# Patient Record
Sex: FEMALE | Race: WHITE | NOT HISPANIC OR LATINO | Employment: OTHER | URBAN - METROPOLITAN AREA
[De-identification: names, ages, dates, MRNs, and addresses within clinical notes are randomized per-mention and may not be internally consistent; named-entity substitution may affect disease eponyms.]

---

## 2023-10-24 DIAGNOSIS — J01.10 ACUTE FRONTAL SINUSITIS, RECURRENCE NOT SPECIFIED: Primary | ICD-10-CM

## 2023-10-24 RX ORDER — DOXYCYCLINE 100 MG/1
100 CAPSULE ORAL 2 TIMES DAILY
Qty: 20 CAPSULE | Refills: 0 | Status: SHIPPED | OUTPATIENT
Start: 2023-10-24 | End: 2023-11-03

## 2024-02-06 DIAGNOSIS — F41.9 ANXIETY: Primary | ICD-10-CM

## 2024-02-06 RX ORDER — SERTRALINE HYDROCHLORIDE 50 MG/1
50 TABLET, FILM COATED ORAL DAILY
Qty: 90 TABLET | Refills: 3 | Status: SHIPPED | OUTPATIENT
Start: 2024-02-06 | End: 2025-01-31

## 2024-02-06 NOTE — PROGRESS NOTES
Subjective   Patient ID: Marilyn Acosta is a 74 y.o. female who presents for No chief complaint on file..  HPI    There is no problem list on file for this patient.      Social Connections: Not on file       No current outpatient medications on file prior to visit.     No current facility-administered medications on file prior to visit.        There were no vitals filed for this visit.  There were no vitals filed for this visit.    Review of Systems    Objective     Physical Exam    No visits with results within 2 Month(s) from this visit.   Latest known visit with results is:   No results found for any previous visit.       Assessment/Plan

## 2024-06-16 NOTE — PROGRESS NOTES
Subjective   Patient ID: Marilyn Acosta is a 74 y.o. female who presents for No chief complaint on file..  HPI    There is no problem list on file for this patient.      Social Connections: Not on file       Current Outpatient Medications on File Prior to Visit   Medication Sig Dispense Refill    sertraline (Zoloft) 50 mg tablet Take 1 tablet (50 mg) by mouth once daily. 90 tablet 3     No current facility-administered medications on file prior to visit.        There were no vitals filed for this visit.  There were no vitals filed for this visit.    Review of Systems    Objective     Physical Exam    No visits with results within 2 Month(s) from this visit.   Latest known visit with results is:   No results found for any previous visit.       Assessment/Plan

## 2024-08-08 ENCOUNTER — APPOINTMENT (OUTPATIENT)
Dept: PRIMARY CARE | Facility: CLINIC | Age: 74
End: 2024-08-08

## 2024-08-08 VITALS
WEIGHT: 154 LBS | SYSTOLIC BLOOD PRESSURE: 116 MMHG | HEIGHT: 64 IN | BODY MASS INDEX: 26.29 KG/M2 | HEART RATE: 61 BPM | OXYGEN SATURATION: 96 % | DIASTOLIC BLOOD PRESSURE: 72 MMHG | TEMPERATURE: 97.5 F

## 2024-08-08 DIAGNOSIS — Z00.00 MEDICARE ANNUAL WELLNESS VISIT, SUBSEQUENT: ICD-10-CM

## 2024-08-08 DIAGNOSIS — Z12.11 SCREENING FOR MALIGNANT NEOPLASM OF COLON: ICD-10-CM

## 2024-08-08 DIAGNOSIS — F41.9 ANXIETY: ICD-10-CM

## 2024-08-08 DIAGNOSIS — Z00.00 ROUTINE ADULT HEALTH MAINTENANCE: ICD-10-CM

## 2024-08-08 DIAGNOSIS — Z12.31 ENCOUNTER FOR SCREENING MAMMOGRAM FOR MALIGNANT NEOPLASM OF BREAST: Primary | ICD-10-CM

## 2024-08-08 DIAGNOSIS — Z78.0 ASYMPTOMATIC MENOPAUSE: ICD-10-CM

## 2024-08-08 DIAGNOSIS — R53.83 OTHER FATIGUE: ICD-10-CM

## 2024-08-08 PROCEDURE — 1123F ACP DISCUSS/DSCN MKR DOCD: CPT | Performed by: FAMILY MEDICINE

## 2024-08-08 PROCEDURE — 1036F TOBACCO NON-USER: CPT | Performed by: FAMILY MEDICINE

## 2024-08-08 PROCEDURE — 99397 PER PM REEVAL EST PAT 65+ YR: CPT | Performed by: FAMILY MEDICINE

## 2024-08-08 PROCEDURE — 3008F BODY MASS INDEX DOCD: CPT | Performed by: FAMILY MEDICINE

## 2024-08-08 PROCEDURE — 99213 OFFICE O/P EST LOW 20 MIN: CPT | Performed by: FAMILY MEDICINE

## 2024-08-08 PROCEDURE — 1158F ADVNC CARE PLAN TLK DOCD: CPT | Performed by: FAMILY MEDICINE

## 2024-08-08 PROCEDURE — G0439 PPPS, SUBSEQ VISIT: HCPCS | Performed by: FAMILY MEDICINE

## 2024-08-08 PROCEDURE — 1159F MED LIST DOCD IN RCRD: CPT | Performed by: FAMILY MEDICINE

## 2024-08-08 RX ORDER — DULOXETIN HYDROCHLORIDE 60 MG/1
60 CAPSULE, DELAYED RELEASE ORAL DAILY
Qty: 90 CAPSULE | Refills: 1 | Status: SHIPPED | OUTPATIENT
Start: 2024-08-08 | End: 2025-02-04

## 2024-08-08 ASSESSMENT — PATIENT HEALTH QUESTIONNAIRE - PHQ9
SUM OF ALL RESPONSES TO PHQ9 QUESTIONS 1 AND 2: 0
1. LITTLE INTEREST OR PLEASURE IN DOING THINGS: NOT AT ALL
2. FEELING DOWN, DEPRESSED OR HOPELESS: NOT AT ALL

## 2024-08-08 ASSESSMENT — ENCOUNTER SYMPTOMS: HIP PAIN: 1

## 2024-08-08 NOTE — PROGRESS NOTES
Subjective   Patient ID: Marilyn Acosta is a 74 y.o. female who presents for New Patient Visit, Shoulder Pain (Bilat shoulder pain x 7 to 8 yrs    [facility]), and Hip Pain (R hip pain x 5 years   [facility]).  Shoulder Pain     Hip Pain     Pt has been having pain in her B shoulder x 7 years and pain in R hip x 5 years.      Preparing meals - independent  Using telephone - independent  Bladder - continent  Bowel - continent    Recent hospital stays -     ADLs - able to dress, walk and bath independently  Instrumental ADL's - able to shop, maintain finances, managing medications, housekeeping independently    Depression: PHQ-2 (screening 2 mins) - positive    Opioid use - none    Exercise -  most days  Diet - well balanced  Pain score - moderate    Providers        Education - educated pt on healthy eating, exercise,    Falls - none      Counseled pt on living will - pt is putting a will together    CV screening: NA    Colonoscopy:  ordered    Diabetes screening:  Checking BW    Glaucoma screen:      CT chest tob screen:  NA    Mammo: ordered    DEXA:  ordered    PAP/pelvis: NA    Vaccines: declines    There is no problem list on file for this patient.      Social Connections: Not on file       Current Outpatient Medications on File Prior to Visit   Medication Sig Dispense Refill    sertraline (Zoloft) 50 mg tablet Take 1 tablet (50 mg) by mouth once daily. 90 tablet 3     No current facility-administered medications on file prior to visit.        Vitals:    08/08/24 1321   BP: 116/72   Pulse: 61   Temp: 36.4 °C (97.5 °F)   SpO2: 96%     Vitals:    08/08/24 1321   Weight: 69.9 kg (154 lb)       Review of Systems   All other systems reviewed and are negative.      Objective     Physical Exam  Vitals reviewed.   Constitutional:       General: She is not in acute distress.     Appearance: Normal appearance. She is well-developed. She is not diaphoretic.   HENT:      Head: Normocephalic and atraumatic.      Right Ear: Tympanic membrane  normal.      Left Ear: Tympanic membrane normal.      Nose: Nose normal.      Mouth/Throat:      Mouth: Mucous membranes are moist.   Eyes:      Pupils: Pupils are equal, round, and reactive to light.   Cardiovascular:      Rate and Rhythm: Normal rate and regular rhythm.      Heart sounds: Normal heart sounds. No murmur heard.     No friction rub. No gallop.   Pulmonary:      Effort: Pulmonary effort is normal.      Breath sounds: Normal breath sounds. No rales.   Abdominal:      General: Bowel sounds are normal.      Palpations: Abdomen is soft.      Tenderness: There is no abdominal tenderness.   Musculoskeletal:      Cervical back: Normal range of motion and neck supple.   Skin:     General: Skin is warm and dry.   Neurological:      Mental Status: She is alert.   Psychiatric:         Mood and Affect: Mood normal.       No visits with results within 2 Month(s) from this visit.   Latest known visit with results is:   No results found for any previous visit.       Assessment/Plan   Problem List Items Addressed This Visit    None  Visit Diagnoses         Codes    Encounter for screening mammogram for malignant neoplasm of breast    -  Primary Z12.31    Relevant Orders    BI mammo bilateral screening    Routine adult health maintenance     Z00.00    Relevant Orders    Lipid Panel    Comprehensive Metabolic Panel    CBC and Auto Differential    Other fatigue     R53.83    Relevant Orders    CBC and Auto Differential    Screening for malignant neoplasm of colon     Z12.11    Relevant Orders    Colonoscopy Screening; Average Risk Patient    Asymptomatic menopause     Z78.0    Relevant Orders    XR DEXA bone density    Anxiety     F41.9    Relevant Medications    DULoxetine (Cymbalta) 60 mg DR capsule          Switching to Cymbalta which will hopefully give more pain relief.  Fu in 6 mo.